# Patient Record
Sex: FEMALE | Race: WHITE | URBAN - METROPOLITAN AREA
[De-identification: names, ages, dates, MRNs, and addresses within clinical notes are randomized per-mention and may not be internally consistent; named-entity substitution may affect disease eponyms.]

---

## 2020-01-02 ENCOUNTER — OFFICE VISIT (OUTPATIENT)
Dept: ORTHOPEDIC SURGERY | Age: 85
End: 2020-01-02
Payer: MEDICARE

## 2020-01-02 VITALS — BODY MASS INDEX: 21.97 KG/M2 | HEIGHT: 67 IN | WEIGHT: 140 LBS

## 2020-01-02 PROCEDURE — 1123F ACP DISCUSS/DSCN MKR DOCD: CPT | Performed by: PHYSICIAN ASSISTANT

## 2020-01-02 PROCEDURE — 1090F PRES/ABSN URINE INCON ASSESS: CPT | Performed by: PHYSICIAN ASSISTANT

## 2020-01-02 PROCEDURE — 4040F PNEUMOC VAC/ADMIN/RCVD: CPT | Performed by: PHYSICIAN ASSISTANT

## 2020-01-02 PROCEDURE — G8427 DOCREV CUR MEDS BY ELIG CLIN: HCPCS | Performed by: PHYSICIAN ASSISTANT

## 2020-01-02 PROCEDURE — G8420 CALC BMI NORM PARAMETERS: HCPCS | Performed by: PHYSICIAN ASSISTANT

## 2020-01-02 PROCEDURE — G8484 FLU IMMUNIZE NO ADMIN: HCPCS | Performed by: PHYSICIAN ASSISTANT

## 2020-01-02 PROCEDURE — 99213 OFFICE O/P EST LOW 20 MIN: CPT | Performed by: PHYSICIAN ASSISTANT

## 2020-01-02 PROCEDURE — 1036F TOBACCO NON-USER: CPT | Performed by: PHYSICIAN ASSISTANT

## 2020-01-02 RX ORDER — METOPROLOL SUCCINATE AND HYDROCHLOROTHIAZIDE 12.5; 1 MG/1; MG/1
TABLET ORAL
COMMUNITY

## 2020-01-02 RX ORDER — LOSARTAN POTASSIUM 25 MG/1
25 TABLET ORAL DAILY
COMMUNITY

## 2020-01-04 NOTE — PROGRESS NOTES
Patient: Bryan Madison    MRN: [id number]  YOB: 1929          Age: 80 y.o. Sex: female    Subjective     Chief Complaint:  Leg Pain (LEFT LEG/LATERAL SIDED CALF. PAST 2 WEEKS NOTICED \"BUMPS\" NO INJURY NOT VERY PAINFUL. IS DOING PT FOR FOOT)      History of Present Illness:  Bryan Madison is a 80 y.o. female who presents today for evaluation of left lower leg mass. Patient states that she has a history of left foot and ankle pain and has been in outpatient physical therapy. She states the therapist was rubbing the lateral aspect of her left leg and afterwards she noticed to small masses underneath the skin. She states that these bumps/masses are nontender. She states that she's had no pain with either weightbearing or resting activity. She denies any swelling, erythema, or ecchymosis. She is presently ambulating without an assistive device. Pain Assessment  Location of Pain: Leg  Location Modifiers: Left  Severity of Pain: 3  Quality of Pain: Dull, Aching  Duration of Pain: A few minutes  Frequency of Pain: Rarely  Aggravating Factors: Bending, Stretching  Limiting Behavior: Some  Relieving Factors: Rest  Result of Injury: No  Work-Related Injury: No  Are there other pain locations you wish to document?: No      Medical History  Current Medications:   Current Outpatient Medications   Medication Sig Dispense Refill    losartan (COZAAR) 25 MG tablet Take 25 mg by mouth daily      Metoprolol-HCTZ -12.5 MG TB24 Take by mouth       No current facility-administered medications for this visit. Medical History: History reviewed. No pertinent past medical history. Allergies:    Allergies   Allergen Reactions    Avelox [Moxifloxacin]     Azithromycin     Bactrim [Sulfamethoxazole-Trimethoprim]     Cefdinir     Cefditoren     Codeine     Doxycycline     Levofloxacin     Meperidine     Spectinomycin      Problem List:  There is no problem list on file for this patient. Review of Systems:  All systems were reviewed on 1/2/2020 and were negative except as indicated on the ROS form attached to this encounter (or located in the Media tab). Vital Signs:  Ht 5' 7\" (1.702 m)   Wt 140 lb (63.5 kg)   BMI 21.93 kg/m²     General Exam:  Constitutional: Patient is adequately groomed with no evidence of malnutrition  Mental Status: The patient is oriented to time, place and person. The patient's mood and affect are appropriate. Neurological: The patient has good coordination. There is no weakness or sensory deficit. Left lower leg Examination:   Inspection: Today's inspection of left lower leg laterally reveals the skin to be intact with no obvious deformity or swelling noted. Palpation: There is a small pea-sized palpable mass noted over the lateral distal one third of the calf. These are nontender. Range of motion: He has a full range of motion of foot, ankle, and knee without pain    Strength: There are no strength deficits noted upon testing    Special tests: Distal neurovascular is grossly intact    Skin: There are no rashes, ulcerations or lesions    Gait: Without an assistive device or limp    Distal pulses were palpated and there is negative Homans sign. Radiology:  X-rays obtained and reviewed in office:  Views: AP and lateral left tib-fib  Location(s): Left tibia and fibula  Impression: Her no acute or subacute fractures or dislocations noted within the left tibia or fibula    Assessment:  Small pea-sized mass left lateral calf    Impression:   Encounter Diagnoses   Name Primary?     Pain of left lower extremity Yes    Mass of left lower leg        Office Procedures:  Orders Placed This Encounter   Procedures    XR TIBIA FIBULA LEFT (2 VIEWS)     Standing Status:   Future     Number of Occurrences:   1     Standing Expiration Date:   1/2/2021       Treatment Plan:  Patient is to continue with a contrast treatment to the left lateral leg and she may